# Patient Record
Sex: MALE | Race: BLACK OR AFRICAN AMERICAN | NOT HISPANIC OR LATINO | Employment: FULL TIME | URBAN - METROPOLITAN AREA
[De-identification: names, ages, dates, MRNs, and addresses within clinical notes are randomized per-mention and may not be internally consistent; named-entity substitution may affect disease eponyms.]

---

## 2022-11-21 ENCOUNTER — APPOINTMENT (OUTPATIENT)
Dept: GENERAL RADIOLOGY | Facility: CLINIC | Age: 37
End: 2022-11-21
Attending: PHYSICIAN ASSISTANT
Payer: MEDICAID

## 2022-11-21 ENCOUNTER — HOSPITAL ENCOUNTER (EMERGENCY)
Facility: CLINIC | Age: 37
Discharge: HOME | End: 2022-11-21
Attending: PHYSICIAN ASSISTANT | Admitting: PHYSICIAN ASSISTANT
Payer: MEDICAID

## 2022-11-21 VITALS
OXYGEN SATURATION: 100 % | TEMPERATURE: 98.5 F | SYSTOLIC BLOOD PRESSURE: 137 MMHG | WEIGHT: 230 LBS | HEART RATE: 79 BPM | BODY MASS INDEX: 29.52 KG/M2 | RESPIRATION RATE: 20 BRPM | HEIGHT: 74 IN | DIASTOLIC BLOOD PRESSURE: 78 MMHG

## 2022-11-21 DIAGNOSIS — S62.616A CLOSED DISPLACED FRACTURE OF PROXIMAL PHALANX OF RIGHT LITTLE FINGER, INITIAL ENCOUNTER: ICD-10-CM

## 2022-11-21 PROCEDURE — 999N000065 XR HAND RIGHT G/E 3 VIEWS: Mod: RT

## 2022-11-21 PROCEDURE — 73130 X-RAY EXAM OF HAND: CPT | Mod: RT

## 2022-11-21 PROCEDURE — 99285 EMERGENCY DEPT VISIT HI MDM: CPT | Mod: 25

## 2022-11-21 PROCEDURE — 26720 TREAT FINGER FRACTURE EACH: CPT | Mod: RT

## 2022-11-21 RX ORDER — OXYCODONE HYDROCHLORIDE 5 MG/1
5 TABLET ORAL EVERY 6 HOURS PRN
Qty: 12 TABLET | Refills: 0 | Status: SHIPPED | OUTPATIENT
Start: 2022-11-21 | End: 2022-11-24

## 2022-11-21 ASSESSMENT — ACTIVITIES OF DAILY LIVING (ADL): ADLS_ACUITY_SCORE: 35

## 2022-11-21 NOTE — Clinical Note
Royal Kidd was seen and treated in our emergency department on 11/21/2022.  He may return to work on 11/23/2022.  Restrictions: May return to work if restrictions can be accommodated.  No use of right hand until cleared by orthopedics.     If you have any questions or concerns, please don't hesitate to call.      Saulo Last MD

## 2022-11-22 NOTE — ED PROVIDER NOTES
History     Chief Complaint:  Hand Pain       HPI   Royal HARLEY Kidd is a 37 year old male  that was working out today when he was lifting the bar of his weights and the weight fell hyper extending his right 5th finger. Now he is having pain over the distal fifth finger and swelling.  He cannot bend it at the PIP or DIP joints.  He denies any pain proximally to the finger and his wrist or elbow.  He denies any distal numbness.    ROS:  Review of Systems       Allergies:  No Known Allergies     Medications:    None    Past Medical History:    None    Past Surgical History:    No pertinent surgical history.    Social History:  Presents to the ED alone.    Physical Exam     No data found.     Physical Exam  Vitals and nursing note reviewed.   Eyes:      General: No scleral icterus.  Pulmonary:      Effort: Pulmonary effort is normal.   Musculoskeletal:      Right hand: Swelling, deformity and bony tenderness present. Decreased range of motion. Decreased strength. Normal sensation. Normal capillary refill.      Comments: Well and deformity fo the right 5th finger at the base of the proximal phalnx of the 5th finger. No open wound.   Skin:     General: Skin is warm.      Capillary Refill: Capillary refill takes less than 2 seconds.      Coloration: Skin is not jaundiced or pale.      Findings: No erythema.   Neurological:      General: No focal deficit present.      Mental Status: He is alert.      Sensory: No sensory deficit.   Psychiatric:         Mood and Affect: Mood normal.         Behavior: Behavior normal.         Thought Content: Thought content normal.               Emergency Department Course     Imaging:  XR Hand Right G/E 3 Views   Final Result   IMPRESSION: Bone detail is obscured by overlying cast material. There is persistent dorsal angulation of the fracture of the shaft of the proximal phalanx small finger.      XR Hand Right G/E 3 Views   Final Result   IMPRESSION: Comminuted, moderately angulated and  displaced fracture of the fifth finger proximal phalanx. Associated soft tissue swelling         Report per radiology    Laboratory:  Labs Ordered and Resulted from Time of ED Arrival to Time of ED Departure - No data to display     Procedures      Digital Block       Procedure: Digital Block    Indication: Wound care    Consent: Verbal    Preparation: Alcohol    Anesthesia: A digital block was performed with Bupivacaine - 0.5% on the affected digit.     Location: R fifth (pinky) finger    Procedure Detail: A digital block was performed on the indicated digit with the above anesthetic.     Patient status: The patient tolerated the procedure well: Yes. There were no complications.            Emergency Department Course:             Reviewed:  I reviewed nursing notes, vitals and past medical history    Assessments:   I obtained history and examined the patient as noted above.    I rechecked the patient and explained findings.       Consults:       Interventions:  Medications - No data to display     Disposition:  The patient was discharged to home.     Impression & Plan    CMS Diagnoses: None    Medical Decision Making:  This is a 37-year-old male who injured his right fifth finger working out today.  X-ray imaging shows comminuted angulated fracture of the base of the right proximal fifth phalanx.  He is neurovascularly intact.  After digital block and attempted bedside reduction was performed this finger strained up better.  Repeat x-ray however shows fracture without significant improvement.  Patient's hand was placed in ulnar gutter splint and will plan close follow-up with outpatient orthopedics.  He was provided with pain medication.  Return back to the emergency department if neurovascular status of the finger decline he develops increasing pain or worsening condition or concerns.      Diagnosis:    ICD-10-CM    1. Closed displaced fracture of proximal phalanx of right little finger, initial encounter   S62.616A            Discharge Medications:  There are no discharge medications for this patient.       11/21/2022   Isaac Hdz PA-C Kruger, Jacob C, PA-C  11/23/22 1657

## 2022-11-22 NOTE — ED TRIAGE NOTES
Pt. Was working out and got right hand caught in workout equipment.  No open wound noted.  Pt. Did not fall down and denies hitting head or LOC.     Triage Assessment     Row Name 11/21/22 2127       Triage Assessment (Adult)    Airway WDL WDL       Respiratory WDL    Respiratory WDL WDL       Skin Circulation/Temperature WDL    Skin Circulation/Temperature WDL X  right hand injury from workout equipment       Cardiac WDL    Cardiac WDL WDL       Peripheral/Neurovascular WDL    Peripheral Neurovascular WDL WDL       Cognitive/Neuro/Behavioral WDL    Cognitive/Neuro/Behavioral WDL WDL

## 2022-11-22 NOTE — ED PROVIDER NOTES
Emergency Department Attending Supervision Note  11/21/2022  10:05 PM      I evaluated this patient with FIDENCIO Cooper.       Briefly, the patient presented with right hand pain after with evening accident in which a bar rolled off his hand and hyperextended his pinky.  He is right-handed.      On my exam, ecchymotic swollen right fifth digit, greatest at the base. Good cap refill.     [facility]    -Fracture    Date/Time: 11/21/2022 10:53 PM  Performed by: Saulo Last MD  Authorized by: Saulo Last MD     Risks, benefits and alternatives discussed.      INJURY      Injury location:  Finger    Finger injury location:  R little finger    Finger fracture type: proximal phalanx fracture      MCP joint involved: no      IP joint involved: no      PRE PROCEDURE ASSESSMENT      Neurological function: normal      Distal perfusion: normal      Range of motion: reduced      ANESTHESIA (see MAR for exact dosages)      Anesthesia method:  Nerve block    Block location:  Palmar digital block    PROCEDURE DETAILS:     Manipulation performed: yes      Skin traction used: yes      Immobilization:  Splint    Splint type:  Ulnar gutter (short)    Supplies used:  Plaster    POST PROCEDURE ASSESSMENT      Neurological function: normal      Distal perfusion: normal      Range of motion: improved        PROCEDURE    Patient Tolerance:  Patient tolerated the procedure well with no immediate complications        Workup is notable for:  XR Hand Right G/E 3 Views   Final Result   IMPRESSION: Bone detail is obscured by overlying cast material. There is persistent dorsal angulation of the fracture of the shaft of the proximal phalanx small finger.      XR Hand Right G/E 3 Views   Final Result   IMPRESSION: Comminuted, moderately angulated and displaced fracture of the fifth finger proximal phalanx. Associated soft tissue swelling            In summary, my impression is comminuted angulated  fracture at the base of the right proximal fifth phalanx.  He is neurovascularly intact distally.  After hematoma block, bedside reduction was performed, I was able to better straighten the finger, he had improved flexion afterwards, repeat xray without significant improvement, ? Volar plate injury. WIll plan for close outpatient ortho hand follow up.    Comminuted displaced 5th digit fracture.    Disposition:  Discharge    Saulo Last MD  [facility]  [facility]    10:05 PM 11/21/22             Saulo Last MD  11/21/22 [id number]